# Patient Record
Sex: MALE | Race: WHITE | NOT HISPANIC OR LATINO
[De-identification: names, ages, dates, MRNs, and addresses within clinical notes are randomized per-mention and may not be internally consistent; named-entity substitution may affect disease eponyms.]

---

## 2021-07-19 PROBLEM — Z00.00 ENCOUNTER FOR PREVENTIVE HEALTH EXAMINATION: Status: ACTIVE | Noted: 2021-07-19

## 2021-08-13 ENCOUNTER — APPOINTMENT (OUTPATIENT)
Dept: HEART AND VASCULAR | Facility: CLINIC | Age: 26
End: 2021-08-13
Payer: COMMERCIAL

## 2021-08-13 ENCOUNTER — NON-APPOINTMENT (OUTPATIENT)
Age: 26
End: 2021-08-13

## 2021-08-13 PROCEDURE — 93975 VASCULAR STUDY: CPT

## 2021-08-13 PROCEDURE — 93930 UPPER EXTREMITY STUDY: CPT

## 2021-08-18 ENCOUNTER — NON-APPOINTMENT (OUTPATIENT)
Age: 26
End: 2021-08-18

## 2021-10-15 ENCOUNTER — APPOINTMENT (OUTPATIENT)
Dept: HEART AND VASCULAR | Facility: CLINIC | Age: 26
End: 2021-10-15

## 2021-11-05 ENCOUNTER — APPOINTMENT (OUTPATIENT)
Dept: HEART AND VASCULAR | Facility: CLINIC | Age: 26
End: 2021-11-05
Payer: COMMERCIAL

## 2021-11-05 VITALS
HEART RATE: 72 BPM | TEMPERATURE: 97.5 F | WEIGHT: 116 LBS | RESPIRATION RATE: 16 BRPM | HEIGHT: 62 IN | BODY MASS INDEX: 21.35 KG/M2 | DIASTOLIC BLOOD PRESSURE: 96 MMHG | SYSTOLIC BLOOD PRESSURE: 132 MMHG

## 2021-11-05 DIAGNOSIS — F79 UNSPECIFIED INTELLECTUAL DISABILITIES: ICD-10-CM

## 2021-11-05 DIAGNOSIS — Z84.1 FAMILY HISTORY OF DISORDERS OF KIDNEY AND URETER: ICD-10-CM

## 2021-11-05 DIAGNOSIS — Z78.9 OTHER SPECIFIED HEALTH STATUS: ICD-10-CM

## 2021-11-05 DIAGNOSIS — I35.1 NONRHEUMATIC AORTIC (VALVE) INSUFFICIENCY: ICD-10-CM

## 2021-11-05 DIAGNOSIS — E26.9 HYPERALDOSTERONISM, UNSPECIFIED: ICD-10-CM

## 2021-11-05 DIAGNOSIS — E55.9 VITAMIN D DEFICIENCY, UNSPECIFIED: ICD-10-CM

## 2021-11-05 DIAGNOSIS — Z85.6 PERSONAL HISTORY OF LEUKEMIA: ICD-10-CM

## 2021-11-05 DIAGNOSIS — Z82.49 FAMILY HISTORY OF ISCHEMIC HEART DISEASE AND OTHER DISEASES OF THE CIRCULATORY SYSTEM: ICD-10-CM

## 2021-11-05 DIAGNOSIS — Z87.09 PERSONAL HISTORY OF OTHER DISEASES OF THE RESPIRATORY SYSTEM: ICD-10-CM

## 2021-11-05 DIAGNOSIS — E78.5 HYPERLIPIDEMIA, UNSPECIFIED: ICD-10-CM

## 2021-11-05 DIAGNOSIS — I10 ESSENTIAL (PRIMARY) HYPERTENSION: ICD-10-CM

## 2021-11-05 PROCEDURE — 99215 OFFICE O/P EST HI 40 MIN: CPT

## 2021-11-05 PROCEDURE — 99205 OFFICE O/P NEW HI 60 MIN: CPT

## 2021-11-05 NOTE — REASON FOR VISIT
[Structural Heart and Valve Disease] : structural heart and valve disease [Hyperlipidemia] : hyperlipidemia [Hypertension] : hypertension [Parent] : parent [FreeTextEntry3] : Karyna Luong

## 2021-11-05 NOTE — DISCUSSION/SUMMARY
[Hyperlipidemia] : hyperlipidemia [Deteriorating] : deteriorating [Diet Modification] : diet modification [Exercise] : exercise [Hypertension] : hypertension [Responding to Treatment] : responding to treatment [None] : There are no changes in medication management [Exercise Regimen] : an exercise regimen [Dietary Modification] : dietary modification [Patient] : the patient [Low Sodium Diet] : low sodium diet [FreeTextEntry1] : AI - mild\par vitamin D deficiency\par inc PTHI\par inc aldosterone\par

## 2021-11-05 NOTE — PHYSICAL EXAM

## 2021-11-05 NOTE — HISTORY OF PRESENT ILLNESS
[FreeTextEntry1] : Vernon Ortiz is 25 yo male who presents for CV evaluation.\par \par During the summer 2021, his parents checked his BP.  The BP was elevated.  He was evaluated at [facility].  He has followed up with primary care.\par \par He has made significant changes in his diet.  BP has improved but not completely normal.\par \par He denies cp, sob, pnd, orthopnea, edema, palp, or loc.\par \par He is active.  He is no medication.\par \par UE arterial duplex 8/2021: nl\par Renal artery duplex 8/2021: nl\par TTE 9/2021: nl lv sys fxn; mild AI; thickened AV/MV\par Blood work 8/2021: inc aldosterone; dec vitamin D; inc PTHI\par \par Clinical hx reviewed in detail\par \par Recommendations:\par 1. continue with lifestyle modifications\par 2. wants to avoid meds\par 3. get additional blood work\par 4. treadmill ECG to assess BP response to exercise\par 5. consider nephrology/endocrine referral

## 2021-11-08 LAB — RENIN PLASMA: 7.3 PG/ML

## 2021-11-11 LAB
DOPAMINE UR-MCNC: <30 PG/ML
EPINEPH UR-MCNC: 24 PG/ML
NOREPINEPH UR-MCNC: 541 PG/ML
SEROTONIN SERUM: 68 NG/ML

## 2021-11-15 ENCOUNTER — NON-APPOINTMENT (OUTPATIENT)
Age: 26
End: 2021-11-15

## 2021-11-15 LAB
CORTICOSTEROID BIND GLOBULIN: 1.4 MG/DL
CORTIS SERPL-MCNC: 9.4 UG/DL
CORTISOL, FREE: 1.6 UG/DL
PFCX: 17 %

## 2021-11-18 ENCOUNTER — NON-APPOINTMENT (OUTPATIENT)
Age: 26
End: 2021-11-18